# Patient Record
Sex: MALE | Race: WHITE | NOT HISPANIC OR LATINO
[De-identification: names, ages, dates, MRNs, and addresses within clinical notes are randomized per-mention and may not be internally consistent; named-entity substitution may affect disease eponyms.]

---

## 2019-03-10 PROBLEM — Z00.00 ENCOUNTER FOR PREVENTIVE HEALTH EXAMINATION: Status: ACTIVE | Noted: 2019-03-10

## 2019-04-09 ENCOUNTER — RESULT CHARGE (OUTPATIENT)
Age: 49
End: 2019-04-09

## 2019-04-09 ENCOUNTER — APPOINTMENT (OUTPATIENT)
Dept: ENDOCRINOLOGY | Facility: CLINIC | Age: 49
End: 2019-04-09
Payer: COMMERCIAL

## 2019-04-09 VITALS — DIASTOLIC BLOOD PRESSURE: 90 MMHG | HEART RATE: 100 BPM | HEIGHT: 68 IN | SYSTOLIC BLOOD PRESSURE: 154 MMHG

## 2019-04-09 DIAGNOSIS — Z78.9 OTHER SPECIFIED HEALTH STATUS: ICD-10-CM

## 2019-04-09 DIAGNOSIS — Z82.49 FAMILY HISTORY OF ISCHEMIC HEART DISEASE AND OTHER DISEASES OF THE CIRCULATORY SYSTEM: ICD-10-CM

## 2019-04-09 DIAGNOSIS — Z84.2 FAMILY HISTORY OF OTHER DISEASES OF THE GENITOURINARY SYSTEM: ICD-10-CM

## 2019-04-09 DIAGNOSIS — Z86.79 PERSONAL HISTORY OF OTHER DISEASES OF THE CIRCULATORY SYSTEM: ICD-10-CM

## 2019-04-09 DIAGNOSIS — Z86.39 PERSONAL HISTORY OF OTHER ENDOCRINE, NUTRITIONAL AND METABOLIC DISEASE: ICD-10-CM

## 2019-04-09 DIAGNOSIS — Z83.3 FAMILY HISTORY OF DIABETES MELLITUS: ICD-10-CM

## 2019-04-09 LAB — GLUCOSE BLDC GLUCOMTR-MCNC: 135

## 2019-04-09 PROCEDURE — 99204 OFFICE O/P NEW MOD 45 MIN: CPT | Mod: 25

## 2019-04-09 PROCEDURE — 82962 GLUCOSE BLOOD TEST: CPT

## 2019-04-09 RX ORDER — LANCETS
EACH MISCELLANEOUS
Qty: 100 | Refills: 3 | Status: ACTIVE | COMMUNITY
Start: 2019-04-09 | End: 1900-01-01

## 2019-04-09 RX ORDER — BLOOD SUGAR DIAGNOSTIC
STRIP MISCELLANEOUS
Qty: 100 | Refills: 3 | Status: ACTIVE | COMMUNITY
Start: 2019-04-09 | End: 1900-01-01

## 2019-04-09 NOTE — HISTORY OF PRESENT ILLNESS
[FreeTextEntry1] : DM type:2\par Severity:mild\par Duration:recent\par Onset:found diabetic during hospitalization for posterior neck abscess. A1C 12 at that time\par \par Modifying Factors:improving with diet and weight loss\par \par Current regimen:\par metformin 1000 bid - frequent cramps and diarrhea. Has rx for metformin ER 1000 mg daily\par \par Current control:\par marked improvement with postprandials and morning glucose levels in the low 100 ranges\par \par \par PMH:\par renal stones, none in years\par pilonidal cyst\par hypertension\par

## 2019-04-09 NOTE — REVIEW OF SYSTEMS
[Recent Weight Loss (___ Lbs)] : recent [unfilled] ~Ulb weight loss [Heartburn] : heartburn [Diarrhea] : diarrhea [Abdominal Pain] : abdominal pain [Back Pain] : back pain [Decreased Libido] : decreased libido [Myalgia] : myalgia  [Anxiety] : anxiety [All other systems negative] : All other systems negative

## 2019-04-09 NOTE — ASSESSMENT
[FreeTextEntry1] : DM type 2, improving with weight loss. 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

## 2019-04-09 NOTE — PHYSICAL EXAM
[Well Nourished] : well nourished [Well Developed] : well developed [No Proptosis] : no proptosis [EOMI] : extra ocular movement intact [No Thyroid Nodules] : there were no palpable thyroid nodules [Normal Rate and Effort] : normal respiratory rhythm and effort [Thyroid Not Enlarged] : the thyroid was not enlarged [Normal S1, S2] : normal S1 and S2 [Clear to Auscultation] : lungs were clear to auscultation bilaterally [Normal Strength/Tone] : muscle strength and tone were normal [Regular Rhythm] : with a regular rhythm [No Clubbing, Cyanosis] : no clubbing  or cyanosis of the fingernails [No Motor Deficits] : the motor exam was normal [No Tremors] : no tremors [Normal Mood] : the mood was normal [Normal Affect] : the affect was normal [de-identified] : warm and moist, no acanthosis

## 2019-09-17 ENCOUNTER — RESULT CHARGE (OUTPATIENT)
Age: 49
End: 2019-09-17

## 2019-09-17 ENCOUNTER — LABORATORY RESULT (OUTPATIENT)
Age: 49
End: 2019-09-17

## 2019-09-17 ENCOUNTER — APPOINTMENT (OUTPATIENT)
Dept: ENDOCRINOLOGY | Facility: CLINIC | Age: 49
End: 2019-09-17
Payer: COMMERCIAL

## 2019-09-17 VITALS
DIASTOLIC BLOOD PRESSURE: 100 MMHG | HEIGHT: 68 IN | HEART RATE: 101 BPM | SYSTOLIC BLOOD PRESSURE: 140 MMHG | BODY MASS INDEX: 42.44 KG/M2 | WEIGHT: 280 LBS

## 2019-09-17 DIAGNOSIS — E11.9 TYPE 2 DIABETES MELLITUS W/OUT COMPLICATIONS: ICD-10-CM

## 2019-09-17 DIAGNOSIS — I10 ESSENTIAL (PRIMARY) HYPERTENSION: ICD-10-CM

## 2019-09-17 LAB — GLUCOSE BLDC GLUCOMTR-MCNC: 155

## 2019-09-17 PROCEDURE — 99214 OFFICE O/P EST MOD 30 MIN: CPT | Mod: 25

## 2019-09-17 PROCEDURE — 82962 GLUCOSE BLOOD TEST: CPT

## 2019-09-17 RX ORDER — LISINOPRIL AND HYDROCHLOROTHIAZIDE TABLETS 20; 12.5 MG/1; MG/1
20-12.5 TABLET ORAL DAILY
Refills: 0 | Status: ACTIVE | COMMUNITY

## 2019-09-17 RX ORDER — TERBINAFINE HYDROCHLORIDE 250 MG/1
250 TABLET ORAL DAILY
Refills: 0 | Status: DISCONTINUED | COMMUNITY
End: 2019-09-17

## 2019-09-17 RX ORDER — CYCLOBENZAPRINE HYDROCHLORIDE 7.5 MG/1
TABLET, FILM COATED ORAL
Refills: 0 | Status: ACTIVE | COMMUNITY

## 2019-09-17 NOTE — ASSESSMENT
[FreeTextEntry1] : DM type 2, possible losing control. If significant rise in A1C will rx a sglt-2, as metformin dose is at the maxiamlly tolerated level\par Hypertension not at goal; if unable to lose weight may also benefit from a sglt-2 for improved BP control

## 2019-09-17 NOTE — HISTORY OF PRESENT ILLNESS
[FreeTextEntry1] : DM type:2\par Severity:mild\par Duration:recent\par Onset:found diabetic during hospitalization for posterior neck abscess. A1C 12 at that time\par \par Modifying Factors:improving with diet and weight loss\par \par Current regimen:\par metformin 1000 bid - frequent cramps and diarrhea. Has rx for metformin  two daily with improvement\par \par Current control:\par marked improvement with postprandials and morning glucose levels in the low 100 ranges\par \par \par PMH:\par renal stones, none in years\par pilonidal cyst\par hypertension\par

## 2019-09-19 ENCOUNTER — RX RENEWAL (OUTPATIENT)
Age: 49
End: 2019-09-19

## 2020-01-28 ENCOUNTER — APPOINTMENT (OUTPATIENT)
Dept: ENDOCRINOLOGY | Facility: CLINIC | Age: 50
End: 2020-01-28

## 2020-03-11 ENCOUNTER — RX RENEWAL (OUTPATIENT)
Age: 50
End: 2020-03-11

## 2020-09-11 ENCOUNTER — RX RENEWAL (OUTPATIENT)
Age: 50
End: 2020-09-11

## 2020-12-07 RX ORDER — METFORMIN HYDROCHLORIDE 500 MG/1
500 TABLET, COATED ORAL
Qty: 360 | Refills: 0 | Status: DISCONTINUED | COMMUNITY
Start: 1900-01-01 | End: 2020-12-07

## 2021-03-21 ENCOUNTER — RX RENEWAL (OUTPATIENT)
Age: 51
End: 2021-03-21

## 2021-03-21 RX ORDER — EMPAGLIFLOZIN 10 MG/1
10 TABLET, FILM COATED ORAL
Qty: 90 | Refills: 1 | Status: ACTIVE | COMMUNITY
Start: 2019-09-19 | End: 1900-01-01

## 2021-04-15 ENCOUNTER — APPOINTMENT (OUTPATIENT)
Dept: ENDOCRINOLOGY | Facility: CLINIC | Age: 51
End: 2021-04-15

## 2021-06-01 ENCOUNTER — RX RENEWAL (OUTPATIENT)
Age: 51
End: 2021-06-01

## 2021-06-01 RX ORDER — METFORMIN ER 500 MG 500 MG/1
500 TABLET ORAL
Qty: 360 | Refills: 1 | Status: ACTIVE | COMMUNITY
Start: 2020-12-07 | End: 1900-01-01

## 2021-11-21 ENCOUNTER — RX RENEWAL (OUTPATIENT)
Age: 51
End: 2021-11-21

## 2021-12-16 ENCOUNTER — RX RENEWAL (OUTPATIENT)
Age: 51
End: 2021-12-16